# Patient Record
Sex: FEMALE | URBAN - METROPOLITAN AREA
[De-identification: names, ages, dates, MRNs, and addresses within clinical notes are randomized per-mention and may not be internally consistent; named-entity substitution may affect disease eponyms.]

---

## 2021-05-13 ENCOUNTER — OFFICE VISIT (OUTPATIENT)
Dept: URBAN - METROPOLITAN AREA CLINIC 72 | Facility: CLINIC | Age: 71
End: 2021-05-13
Payer: MEDICARE

## 2021-05-13 ENCOUNTER — WEB ENCOUNTER (OUTPATIENT)
Dept: URBAN - METROPOLITAN AREA CLINIC 72 | Facility: CLINIC | Age: 71
End: 2021-05-13

## 2021-05-13 VITALS
BODY MASS INDEX: 30.43 KG/M2 | DIASTOLIC BLOOD PRESSURE: 66 MMHG | TEMPERATURE: 96.4 F | WEIGHT: 155 LBS | HEIGHT: 60 IN | SYSTOLIC BLOOD PRESSURE: 130 MMHG | HEART RATE: 66 BPM

## 2021-05-13 DIAGNOSIS — R13.10 ESOPHAGEAL DYSPHAGIA: ICD-10-CM

## 2021-05-13 PROCEDURE — 99203 OFFICE O/P NEW LOW 30 MIN: CPT | Performed by: INTERNAL MEDICINE

## 2021-05-13 RX ORDER — OXYCODONE HYDROCHLORIDE 10 MG/1
1 TABLET AS NEEDED TABLET ORAL
Status: ACTIVE | COMMUNITY

## 2021-05-13 RX ORDER — LISINOPRIL 10 MG/1
1 TABLET TABLET ORAL ONCE A DAY
Status: ACTIVE | COMMUNITY

## 2021-05-13 RX ORDER — FUROSEMIDE 20 MG/1
1 TABLET TABLET ORAL ONCE A DAY
Status: ACTIVE | COMMUNITY

## 2021-05-13 RX ORDER — ALPRAZOLAM 0.5 MG/1
1 TABLET TABLET ORAL TWICE A DAY
Status: ACTIVE | COMMUNITY

## 2021-05-13 RX ORDER — OMEPRAZOLE 20 MG/1
1 CAPSULE 30 MINUTES BEFORE MORNING MEAL CAPSULE, DELAYED RELEASE ORAL ONCE A DAY
Qty: 30 | OUTPATIENT
Start: 2021-05-13

## 2021-05-13 RX ORDER — ONDANSETRON 4 MG/1
1 TABLET ON THE TONGUE AND ALLOW TO DISSOLVE TABLET, ORALLY DISINTEGRATING ORAL ONCE A DAY
Status: ACTIVE | COMMUNITY

## 2021-05-13 RX ORDER — SERTRALINE 50 MG/1
1 TABLET TABLET, FILM COATED ORAL ONCE A DAY
Status: ACTIVE | COMMUNITY

## 2021-05-13 NOTE — HPI-TODAY'S VISIT:
Mrs. Carter is a pleasant 71-year-old female who presents as a new patient for evaluation for abnormal esophagram, she was referred by Dr. Magaly Sarmiento.  Past medical history of breast cancer, spinal bifida, pituitary tumor, iron deficiency, depression, hypertension and colon polyps.  Esophagram showed small hiatal hernia with mild stricture, thickened exophytic lesion at the cardia of the stomach which may represent inflammation versus redundant rugae and mild reflux   she reports that she is having issues with swallowing both solids and liquids.  She notes it feels like it gets stuck in her midesophagus, she has occasionally had to regurgitate food.  She endorses weight loss but is unsure if this is related to her breast cancer diagnosis versus her inability to swallow.  She denies any hematemesis endorses some occasional odynophagia.

## 2021-05-26 ENCOUNTER — OFFICE VISIT (OUTPATIENT)
Dept: URBAN - METROPOLITAN AREA MEDICAL CENTER 40 | Facility: MEDICAL CENTER | Age: 71
End: 2021-05-26
Payer: MEDICARE

## 2021-05-26 DIAGNOSIS — K21.9 ACID REFLUX: ICD-10-CM

## 2021-05-26 DIAGNOSIS — K29.60 EROSIVE GASTRITIS: ICD-10-CM

## 2021-05-26 DIAGNOSIS — K57.10 DIVERTICULOSIS OF DUODENUM: ICD-10-CM

## 2021-05-26 DIAGNOSIS — R13.19 DYSPHAGIA CAUSING PULMONARY ASPIRATION WITH SWALLOWING: ICD-10-CM

## 2021-05-26 DIAGNOSIS — K22.2 ACQUIRED ESOPHAGEAL RING: ICD-10-CM

## 2021-05-26 PROCEDURE — 43239 EGD BIOPSY SINGLE/MULTIPLE: CPT | Performed by: INTERNAL MEDICINE

## 2021-05-26 PROCEDURE — 43249 ESOPH EGD DILATION <30 MM: CPT | Performed by: INTERNAL MEDICINE

## 2021-05-26 RX ORDER — ONDANSETRON 4 MG/1
1 TABLET ON THE TONGUE AND ALLOW TO DISSOLVE TABLET, ORALLY DISINTEGRATING ORAL ONCE A DAY
Status: ACTIVE | COMMUNITY

## 2021-05-26 RX ORDER — OXYCODONE HYDROCHLORIDE 10 MG/1
1 TABLET AS NEEDED TABLET ORAL
Status: ACTIVE | COMMUNITY

## 2021-05-26 RX ORDER — LISINOPRIL 10 MG/1
1 TABLET TABLET ORAL ONCE A DAY
Status: ACTIVE | COMMUNITY

## 2021-05-26 RX ORDER — FUROSEMIDE 20 MG/1
1 TABLET TABLET ORAL ONCE A DAY
Status: ACTIVE | COMMUNITY

## 2021-05-26 RX ORDER — OMEPRAZOLE 20 MG/1
1 CAPSULE 30 MINUTES BEFORE MORNING MEAL CAPSULE, DELAYED RELEASE ORAL ONCE A DAY
Qty: 30 | Status: ACTIVE | COMMUNITY
Start: 2021-05-13

## 2021-05-26 RX ORDER — SERTRALINE 50 MG/1
1 TABLET TABLET, FILM COATED ORAL ONCE A DAY
Status: ACTIVE | COMMUNITY

## 2021-05-26 RX ORDER — ALPRAZOLAM 0.5 MG/1
1 TABLET TABLET ORAL TWICE A DAY
Status: ACTIVE | COMMUNITY

## 2021-06-22 ENCOUNTER — WEB ENCOUNTER (OUTPATIENT)
Dept: URBAN - METROPOLITAN AREA CLINIC 72 | Facility: CLINIC | Age: 71
End: 2021-06-22

## 2021-06-22 ENCOUNTER — OFFICE VISIT (OUTPATIENT)
Dept: URBAN - METROPOLITAN AREA CLINIC 72 | Facility: CLINIC | Age: 71
End: 2021-06-22
Payer: MEDICARE

## 2021-06-22 ENCOUNTER — LAB OUTSIDE AN ENCOUNTER (OUTPATIENT)
Dept: URBAN - METROPOLITAN AREA CLINIC 72 | Facility: CLINIC | Age: 71
End: 2021-06-22

## 2021-06-22 VITALS
HEART RATE: 77 BPM | DIASTOLIC BLOOD PRESSURE: 61 MMHG | TEMPERATURE: 97.9 F | SYSTOLIC BLOOD PRESSURE: 115 MMHG | BODY MASS INDEX: 31.41 KG/M2 | HEIGHT: 60 IN | WEIGHT: 160 LBS

## 2021-06-22 DIAGNOSIS — K29.60 EROSIVE GASTRITIS: ICD-10-CM

## 2021-06-22 DIAGNOSIS — K44.9 HIATAL HERNIA: ICD-10-CM

## 2021-06-22 DIAGNOSIS — R13.10 ESOPHAGEAL DYSPHAGIA: ICD-10-CM

## 2021-06-22 DIAGNOSIS — K22.2 SCHATZKI'S RING: ICD-10-CM

## 2021-06-22 PROBLEM — 1086791000119100: Status: ACTIVE | Noted: 2021-06-22

## 2021-06-22 PROCEDURE — 99214 OFFICE O/P EST MOD 30 MIN: CPT | Performed by: INTERNAL MEDICINE

## 2021-06-22 RX ORDER — OXYCODONE HYDROCHLORIDE 10 MG/1
1 TABLET AS NEEDED TABLET ORAL
Status: ACTIVE | COMMUNITY

## 2021-06-22 RX ORDER — LISINOPRIL 10 MG/1
1 TABLET TABLET ORAL ONCE A DAY
Status: ACTIVE | COMMUNITY

## 2021-06-22 RX ORDER — OMEPRAZOLE 20 MG/1
1 CAPSULE 30 MINUTES BEFORE MORNING MEAL CAPSULE, DELAYED RELEASE ORAL ONCE A DAY
Qty: 30 | Status: ACTIVE | COMMUNITY
Start: 2021-05-13

## 2021-06-22 RX ORDER — FUROSEMIDE 20 MG/1
1 TABLET TABLET ORAL ONCE A DAY
Status: ACTIVE | COMMUNITY

## 2021-06-22 RX ORDER — SERTRALINE 50 MG/1
1 TABLET TABLET, FILM COATED ORAL ONCE A DAY
Status: ACTIVE | COMMUNITY

## 2021-06-22 RX ORDER — ALPRAZOLAM 0.5 MG/1
1 TABLET TABLET ORAL TWICE A DAY
Status: ACTIVE | COMMUNITY

## 2021-06-22 RX ORDER — ONDANSETRON 4 MG/1
1 TABLET ON THE TONGUE AND ALLOW TO DISSOLVE TABLET, ORALLY DISINTEGRATING ORAL ONCE A DAY
Status: ACTIVE | COMMUNITY

## 2021-06-22 NOTE — HPI-TODAY'S VISIT:
Mrs. Catrer returns for follow-up.  She is a 71-year-old female last seen in our office on 5/13/2020 for evaluation of abnormal esophagram.  Past medical history is notable for history of breast cancer, spina bifida, pituitary tumor, iron deficiency, depression, hypertension: Polyps.  She underwent an esophagram due to complaint dysphagia that showed a small hiatal hernia with mild stricture, thickened exophytic lesion at the cardia of the stomach which may represent information versus redundant rugae a mild reflux present.  She reported dysphagia with both solids and liquids, denied any reflux-like symptoms.  EGD performed 5/26/2021 showed a distal esophageal stricture obstructing greater than 50% of the lumen, this was dilated to 17 mm, a hiatal hernia and gastritis were also noted.  Duodenum had a diverticulum.  Biopsies of the stomach showed acute erosive gastritis esophageal biopsies were normal.   she reports that she had no dysphagia for approximately 1 week after the procedure but now she is noting that it seems to be difficult to swallow her pills again.  She would like to arrange for a repeat dilation.she denies any reflux.

## 2021-07-13 ENCOUNTER — ERX REFILL RESPONSE (OUTPATIENT)
Dept: URBAN - METROPOLITAN AREA CLINIC 72 | Facility: CLINIC | Age: 71
End: 2021-07-13

## 2021-07-13 RX ORDER — OMEPRAZOLE 20 MG/1
TAKE 1 CAPSULE BY MOUTH 30 MINUTES BEFORE MORNING MEAL ONCE A DAY CAPSULE, DELAYED RELEASE ORAL
Qty: 30 CAPSULE | Refills: 1 | OUTPATIENT

## 2021-07-13 RX ORDER — OMEPRAZOLE 20 MG/1
1 CAPSULE 30 MINUTES BEFORE MORNING MEAL CAPSULE, DELAYED RELEASE ORAL ONCE A DAY
Qty: 30 | OUTPATIENT

## 2021-07-19 ENCOUNTER — OFFICE VISIT (OUTPATIENT)
Dept: URBAN - METROPOLITAN AREA MEDICAL CENTER 40 | Facility: MEDICAL CENTER | Age: 71
End: 2021-07-19
Payer: MEDICARE

## 2021-07-19 DIAGNOSIS — K21.9 ACID REFLUX: ICD-10-CM

## 2021-07-19 DIAGNOSIS — R13.19 CERVICAL DYSPHAGIA: ICD-10-CM

## 2021-07-19 DIAGNOSIS — K31.89 ACQUIRED DEFORMITY OF DUODENUM: ICD-10-CM

## 2021-07-19 DIAGNOSIS — K22.8 DILATATION OF ESOPHAGUS: ICD-10-CM

## 2021-07-19 DIAGNOSIS — K44.9 ESOPHAGEAL HERNIA: ICD-10-CM

## 2021-07-19 PROCEDURE — 43239 EGD BIOPSY SINGLE/MULTIPLE: CPT | Performed by: INTERNAL MEDICINE

## 2021-08-05 ENCOUNTER — WEB ENCOUNTER (OUTPATIENT)
Dept: URBAN - METROPOLITAN AREA CLINIC 72 | Facility: CLINIC | Age: 71
End: 2021-08-05

## 2021-08-05 ENCOUNTER — DASHBOARD ENCOUNTERS (OUTPATIENT)
Age: 71
End: 2021-08-05

## 2021-08-05 ENCOUNTER — OFFICE VISIT (OUTPATIENT)
Dept: URBAN - METROPOLITAN AREA CLINIC 72 | Facility: CLINIC | Age: 71
End: 2021-08-05
Payer: MEDICARE

## 2021-08-05 VITALS — WEIGHT: 157 LBS | HEIGHT: 60 IN | BODY MASS INDEX: 30.82 KG/M2 | TEMPERATURE: 98.4 F

## 2021-08-05 DIAGNOSIS — K44.9 HIATAL HERNIA: ICD-10-CM

## 2021-08-05 DIAGNOSIS — R13.10 ESOPHAGEAL DYSPHAGIA: ICD-10-CM

## 2021-08-05 DIAGNOSIS — K22.2 SCHATZKI'S RING: ICD-10-CM

## 2021-08-05 PROBLEM — 235623002: Status: ACTIVE | Noted: 2021-06-22

## 2021-08-05 PROBLEM — 40890009: Status: ACTIVE | Noted: 2021-05-13

## 2021-08-05 PROCEDURE — 99214 OFFICE O/P EST MOD 30 MIN: CPT | Performed by: INTERNAL MEDICINE

## 2021-08-05 RX ORDER — FUROSEMIDE 20 MG/1
1 TABLET TABLET ORAL ONCE A DAY
Status: ACTIVE | COMMUNITY

## 2021-08-05 RX ORDER — OMEPRAZOLE 20 MG/1
TAKE 1 CAPSULE BY MOUTH 30 MINUTES BEFORE MORNING MEAL ONCE A DAY CAPSULE, DELAYED RELEASE ORAL
Qty: 30 CAPSULE | Refills: 1 | Status: ACTIVE | COMMUNITY

## 2021-08-05 RX ORDER — ALPRAZOLAM 0.5 MG/1
1 TABLET TABLET ORAL TWICE A DAY
Status: ACTIVE | COMMUNITY

## 2021-08-05 RX ORDER — LISINOPRIL 10 MG/1
1 TABLET TABLET ORAL ONCE A DAY
Status: ACTIVE | COMMUNITY

## 2021-08-05 RX ORDER — SERTRALINE 50 MG/1
1 TABLET TABLET, FILM COATED ORAL ONCE A DAY
Status: ACTIVE | COMMUNITY

## 2021-08-05 RX ORDER — ONDANSETRON 4 MG/1
1 TABLET ON THE TONGUE AND ALLOW TO DISSOLVE TABLET, ORALLY DISINTEGRATING ORAL ONCE A DAY
Status: ACTIVE | COMMUNITY

## 2021-08-05 RX ORDER — OXYCODONE HYDROCHLORIDE 10 MG/1
1 TABLET AS NEEDED TABLET ORAL
Status: ACTIVE | COMMUNITY

## 2021-08-05 NOTE — HPI-TODAY'S VISIT:
Mrs. Carter returns for follow-up.  She is a pleasant 71-year-old female last seen office on 6/22/2021.  History of breast cancer, spina bifida, pituitary tumor, iron deficiency, depression, hypertension, colon polyps, and dysphagia.  An esophagram due to complaints of dysphagia that showed a small hiatal hernia and mild stricture, thickened exophytic lesion at the cardia of the stomach of unclear significance.  Given her symptoms she underwent an EGD on 5/26/2021 that showed distal esophageal stricture obstructing greater than 50% of the liver which is dilated to 17 mm, a hiatal hernia, and gastritis.  Duodenum had diverticulum, biopsies confirm acute erosive gastritis with normal esophageal biopsies.  We saw her she was having ongoing symptoms of dysphagia that she underwent another endoscopy on 7/19/2021 for further dilation.  No stricture was appreciated, 2 cm hiatal hernia seen.  Biopsies from irregular Z line were unremarkable.   sshe has been doing well since we last saw her, no issues with dysphagia currently.

## 2025-05-02 PROBLEM — 87522002: Status: ACTIVE | Noted: 2025-05-02

## 2025-05-02 PROBLEM — 428283002: Status: ACTIVE | Noted: 2025-05-02

## 2025-05-02 PROBLEM — 266435005: Status: ACTIVE | Noted: 2025-05-02

## 2025-05-05 ENCOUNTER — OFFICE VISIT (OUTPATIENT)
Dept: URBAN - METROPOLITAN AREA CLINIC 72 | Facility: CLINIC | Age: 75
End: 2025-05-05

## 2025-05-05 RX ORDER — OXYCODONE HYDROCHLORIDE 10 MG/1
1 TABLET AS NEEDED TABLET ORAL
Status: ACTIVE | COMMUNITY

## 2025-05-05 RX ORDER — ONDANSETRON 4 MG/1
1 TABLET ON THE TONGUE AND ALLOW TO DISSOLVE TABLET, ORALLY DISINTEGRATING ORAL ONCE A DAY
Status: ACTIVE | COMMUNITY

## 2025-05-05 RX ORDER — ALPRAZOLAM 0.5 MG/1
1 TABLET TABLET ORAL TWICE A DAY
Status: ACTIVE | COMMUNITY

## 2025-05-05 RX ORDER — LISINOPRIL 10 MG/1
1 TABLET TABLET ORAL ONCE A DAY
Status: ACTIVE | COMMUNITY

## 2025-05-05 RX ORDER — SERTRALINE 50 MG/1
1 TABLET TABLET, FILM COATED ORAL ONCE A DAY
Status: ACTIVE | COMMUNITY

## 2025-05-05 RX ORDER — OMEPRAZOLE 20 MG/1
TAKE 1 CAPSULE BY MOUTH 30 MINUTES BEFORE MORNING MEAL ONCE A DAY CAPSULE, DELAYED RELEASE ORAL
Qty: 30 CAPSULE | Refills: 1 | Status: ACTIVE | COMMUNITY

## 2025-05-05 RX ORDER — FUROSEMIDE 20 MG/1
1 TABLET TABLET ORAL ONCE A DAY
Status: ACTIVE | COMMUNITY

## 2025-05-05 NOTE — HPI-TODAY'S VISIT:
Patient is a 75-year-old female last seen in the office on 8/5/2021 for EGD follow-up for history of hiatal hernia, Schatzki's ring, and dysphagia.  She is here today for anemia and a colon screening.    She was referred back to us by Dr. Magaly Baxter for iron deficiency anemia and a history of colon polyps.

## 2025-05-05 NOTE — HPI-OTHER HISTORIES
Procedures: 7/19/2021-EGD: Squamocolumnar junction appeared irregular and was located 32 cm from the incisors.  Examination of the stomach revealed gastritis, hiatal hernia.  Paraesophageal hiatal hernia 2 cm in length.  Gastritis is mildly severe.  Normal duodenum.  Path: Mild reactive gastropathy negative for H. pylori.  Esophageal biopsy showed gastric type Jorge mucosa negative for intestinal metaplasia and dysplasia.  5/26/2021-EGD: Esophageal stricture was identified at the distal esophagus.  The stricture was obstructing 50% of the lumen and patient was dilated to 17 mm.  Gastritis and hiatal hernia 5 cm in length was noted duodenal diverticulum noted.  Path: Acute erosive gastritis negative for H. pylori.  Esophageal biopsy showed gastric type columnar mucosa negative for intestinal metaplasia and dysplasia.  DI: 9/18/2024-CT abdomen and pelvis without contrast-no acute abdominal finding including no evidence of nephrolithiasis.  Stable postoperative changes status post partial colon resection with an unremarkable appearing left abdominal colostomy with stable fat containing parastomal hernia.  Labs: 3/5/2025-WBC 6.84, RBC 4.4, hemoglobin 11.9, hematocrit 40.0, MCV 90.9, platelets 283, , K3.8, chloride 109, BUN 15, creatinine 0.7, glucose 82, calcium 9.6, iron 36, TIBC 300, iron sat 12%, ferritin 13.1, total bili 0.5, AST 15, ALT 10,

## 2025-05-08 PROBLEM — 302112009: Status: ACTIVE | Noted: 2025-05-08

## 2025-05-09 ENCOUNTER — OFFICE VISIT (OUTPATIENT)
Dept: URBAN - METROPOLITAN AREA CLINIC 72 | Facility: CLINIC | Age: 75
End: 2025-05-09
Payer: MEDICARE

## 2025-05-09 ENCOUNTER — LAB OUTSIDE AN ENCOUNTER (OUTPATIENT)
Dept: URBAN - METROPOLITAN AREA CLINIC 72 | Facility: CLINIC | Age: 75
End: 2025-05-09

## 2025-05-09 DIAGNOSIS — Z80.0 FH: COLON CANCER: ICD-10-CM

## 2025-05-09 DIAGNOSIS — K21.9 GERD WITHOUT ESOPHAGITIS: ICD-10-CM

## 2025-05-09 DIAGNOSIS — Z86.0100 PERSONAL HISTORY OF COLON POLYPS, UNSPECIFIED: ICD-10-CM

## 2025-05-09 DIAGNOSIS — D50.9 IRON DEFICIENCY ANEMIA, UNSPECIFIED IRON DEFICIENCY ANEMIA TYPE: ICD-10-CM

## 2025-05-09 DIAGNOSIS — Z93.3 COLOSTOMY IN PLACE: ICD-10-CM

## 2025-05-09 PROBLEM — 312824007: Status: ACTIVE | Noted: 2025-05-09

## 2025-05-09 PROCEDURE — 99214 OFFICE O/P EST MOD 30 MIN: CPT

## 2025-05-09 RX ORDER — OMEPRAZOLE 20 MG/1
TAKE 1 CAPSULE BY MOUTH 30 MINUTES BEFORE MORNING MEAL ONCE A DAY CAPSULE, DELAYED RELEASE ORAL
Qty: 30 CAPSULE | Refills: 1 | Status: ACTIVE | COMMUNITY

## 2025-05-09 RX ORDER — SERTRALINE 50 MG/1
1 TABLET TABLET, FILM COATED ORAL ONCE A DAY
Status: ACTIVE | COMMUNITY

## 2025-05-09 RX ORDER — FUROSEMIDE 20 MG/1
1 TABLET TABLET ORAL ONCE A DAY
Status: ACTIVE | COMMUNITY

## 2025-05-09 RX ORDER — FERROUS SULFATE 325(65) MG
1 TABLET TABLET ORAL TWICE A DAY
Status: ACTIVE | COMMUNITY

## 2025-05-09 RX ORDER — ALPRAZOLAM 0.5 MG/1
1 TABLET TABLET ORAL TWICE A DAY
Status: ON HOLD | COMMUNITY

## 2025-05-09 RX ORDER — ONDANSETRON 4 MG/1
1 TABLET ON THE TONGUE AND ALLOW TO DISSOLVE TABLET, ORALLY DISINTEGRATING ORAL ONCE A DAY
Status: ON HOLD | COMMUNITY

## 2025-05-09 RX ORDER — CEFPROZIL 500 MG/1
1 TABLET TABLET, FILM COATED ORAL ONCE A DAY
Status: ACTIVE | COMMUNITY

## 2025-05-09 RX ORDER — ANASTROZOLE 1 MG/1
1 TABLET TABLET, FILM COATED ORAL ONCE A DAY
Status: ACTIVE | COMMUNITY

## 2025-05-09 RX ORDER — OXYCODONE HYDROCHLORIDE 10 MG/1
1 TABLET AS NEEDED TABLET ORAL
Status: ON HOLD | COMMUNITY

## 2025-05-09 RX ORDER — LISINOPRIL 10 MG/1
1 TABLET TABLET ORAL ONCE A DAY
Status: ACTIVE | COMMUNITY

## 2025-05-09 NOTE — HPI-TODAY'S VISIT:
Patient is a 75-year-old female last seen in the office on 8/5/2021 for EGD follow-up.  She is here today for anemia and a colonoscopy consult.  Patient has history of hiatal hernia, Schatzki's ring, and esophageal dysphagia.  Patient had to be dilated twice in 2021 for a esophageal stricture, 2 cm hiatal hernia.  Patient was referred to us on 3/13/2025 by Dr. Magaly Baxter for iron deficiency anemia and a history of colon polyps. Patient is seen today and states that she is very depressed. SHe is not sleeping well - she states that everytime she lies back her ileal consuit ostomy bag pops off. Her colostomy bag and site are fine. Patient states that she doesnt drink water because she doesnt want to pop her bag, She does not feel SOB, dizzy. She saw some blood from her ileostomy about 2 weeks ago it was very litte blood - no pain.   Patient is on omeprazole 20 mg daily for GERD with a history of dysphagia requiring dilations and a Schatzki's ring.She is not having any problems with GERD or dysphagia.   Family history of colorectal cancer in father, and liver disease.

## 2025-05-09 NOTE — HPI-OTHER HISTORIES
Procedures: 7/19/2021-EGD: Squamocolumnar junction appeared irregular and was located 32 cm from incisors.  Gastritis, 2 cm paraesophageal hernia.  Normal duodenum.  Path: Mild reactive gastropathy negative for H. pylori.  Gastric type columnar mucosa negative for intestinal metaplasia and dysplasia.  5/26/2021-EGD: Esophageal stricture identified at the distal esophagus obstructing 50% of the lumen.  Patient dilated with balloon dilator and inflated to 17 mm for the 30 seconds.  Gastritis in the prepyloric region.  5 cm sliding hiatal hernia.  Duodenal diverticulum.  Path: Acute erosive gastritis negative for H. pylori.  Gastric type: Her mucosa negative for intestinal metaplasia and dysplasia.  DI: 9/18/2024-CT abdomen and pelvis without contrast no acute abdominal findings.  Stable findings of cystectomy with an unremarkable appearing right abdominal ileal conduit with associated small fat-containing parastomal hernia.  Stable postoperative changes status post partial colon resection with an unremarkable appearing left abdominal colostomy with stable fat-containing parastomal hernia.  Labs: 3/5/2025-WBC 6.84, RBC 4.4, hemoglobin 11.9, hematocrit 40, MCV 90.9, platelet 283, , K3.8, BUN 15, creatinine 0.7, iron 36, TIBC 300, iron sat 12%, ferritin 13.1, total bili 0.5, AST 15, ALT 10,

## 2025-05-27 ENCOUNTER — TELEPHONE ENCOUNTER (OUTPATIENT)
Dept: URBAN - METROPOLITAN AREA CLINIC 72 | Facility: CLINIC | Age: 75
End: 2025-05-27

## 2025-05-27 RX ORDER — POLYETHYLENE GLYCOL 3350, SODIUM SULFATE ANHYDROUS, SODIUM BICARBONATE, SODIUM CHLORIDE, POTASSIUM CHLORIDE 236; 22.74; 6.74; 5.86; 2.97 G/4L; G/4L; G/4L; G/4L; G/4L
4000 ML POWDER, FOR SOLUTION ORAL ONCE
Qty: 4000 ML | Refills: 0 | OUTPATIENT
Start: 2025-05-27 | End: 2025-05-28

## 2025-08-11 ENCOUNTER — LAB OUTSIDE AN ENCOUNTER (OUTPATIENT)
Dept: URBAN - METROPOLITAN AREA CLINIC 113 | Facility: CLINIC | Age: 75
End: 2025-08-11

## 2025-08-11 ENCOUNTER — TELEPHONE ENCOUNTER (OUTPATIENT)
Dept: URBAN - METROPOLITAN AREA CLINIC 113 | Facility: CLINIC | Age: 75
End: 2025-08-11

## 2025-08-13 ENCOUNTER — TELEPHONE ENCOUNTER (OUTPATIENT)
Dept: URBAN - METROPOLITAN AREA CLINIC 72 | Facility: CLINIC | Age: 75
End: 2025-08-13

## 2025-08-14 ENCOUNTER — OFFICE VISIT (OUTPATIENT)
Dept: URBAN - METROPOLITAN AREA MEDICAL CENTER 40 | Facility: MEDICAL CENTER | Age: 75
End: 2025-08-14
Payer: MEDICARE

## 2025-08-14 DIAGNOSIS — Z86.0100 HISTORY OF COLON POLYPS: ICD-10-CM

## 2025-08-14 DIAGNOSIS — Z80.0 BROTHER AT YOUNG AGE FAMILY HISTORY OF COLON CANCER: ICD-10-CM

## 2025-08-14 DIAGNOSIS — R13.19 OTHER DYSPHAGIA: ICD-10-CM

## 2025-08-14 DIAGNOSIS — K22.89 OTHER SPECIFIED DISEASE OF ESOPHAGUS: ICD-10-CM

## 2025-08-14 DIAGNOSIS — K20.80 OTHER ESOPHAGITIS WITHOUT BLEEDING: ICD-10-CM

## 2025-08-14 DIAGNOSIS — D50.9 ANEMIA: ICD-10-CM

## 2025-08-14 PROCEDURE — 44388 COLONOSCOPY THRU STOMA SPX: CPT | Performed by: INTERNAL MEDICINE

## 2025-08-14 PROCEDURE — 43239 EGD BIOPSY SINGLE/MULTIPLE: CPT | Performed by: INTERNAL MEDICINE

## 2025-08-14 PROCEDURE — 43248 EGD GUIDE WIRE INSERTION: CPT | Performed by: INTERNAL MEDICINE

## 2025-08-14 RX ORDER — CEFPROZIL 500 MG/1
1 TABLET TABLET, FILM COATED ORAL ONCE A DAY
Status: ACTIVE | COMMUNITY

## 2025-08-14 RX ORDER — OXYCODONE HYDROCHLORIDE 10 MG/1
1 TABLET AS NEEDED TABLET ORAL
Status: ON HOLD | COMMUNITY

## 2025-08-14 RX ORDER — SERTRALINE 50 MG/1
1 TABLET TABLET, FILM COATED ORAL ONCE A DAY
Status: ACTIVE | COMMUNITY

## 2025-08-14 RX ORDER — ALPRAZOLAM 0.5 MG/1
1 TABLET TABLET ORAL TWICE A DAY
Status: ON HOLD | COMMUNITY

## 2025-08-14 RX ORDER — FUROSEMIDE 20 MG/1
1 TABLET TABLET ORAL ONCE A DAY
Status: ACTIVE | COMMUNITY

## 2025-08-14 RX ORDER — LISINOPRIL 10 MG/1
1 TABLET TABLET ORAL ONCE A DAY
Status: ACTIVE | COMMUNITY

## 2025-08-14 RX ORDER — ANASTROZOLE 1 MG/1
1 TABLET TABLET, FILM COATED ORAL ONCE A DAY
Status: ACTIVE | COMMUNITY

## 2025-08-14 RX ORDER — FERROUS SULFATE 325(65) MG
1 TABLET TABLET ORAL TWICE A DAY
Status: ACTIVE | COMMUNITY

## 2025-08-14 RX ORDER — ONDANSETRON 4 MG/1
1 TABLET ON THE TONGUE AND ALLOW TO DISSOLVE TABLET, ORALLY DISINTEGRATING ORAL ONCE A DAY
Status: ON HOLD | COMMUNITY

## 2025-08-14 RX ORDER — OMEPRAZOLE 20 MG/1
TAKE 1 CAPSULE BY MOUTH 30 MINUTES BEFORE MORNING MEAL ONCE A DAY CAPSULE, DELAYED RELEASE ORAL
Qty: 30 CAPSULE | Refills: 1 | Status: ACTIVE | COMMUNITY

## 2025-08-27 ENCOUNTER — TELEPHONE ENCOUNTER (OUTPATIENT)
Dept: URBAN - METROPOLITAN AREA CLINIC 72 | Facility: CLINIC | Age: 75
End: 2025-08-27